# Patient Record
Sex: MALE | URBAN - METROPOLITAN AREA
[De-identification: names, ages, dates, MRNs, and addresses within clinical notes are randomized per-mention and may not be internally consistent; named-entity substitution may affect disease eponyms.]

---

## 2018-04-30 ENCOUNTER — APPOINTMENT (OUTPATIENT)
Age: 83
Setting detail: DERMATOLOGY
End: 2018-05-05

## 2018-04-30 VITALS
SYSTOLIC BLOOD PRESSURE: 130 MMHG | WEIGHT: 190 LBS | HEIGHT: 66 IN | DIASTOLIC BLOOD PRESSURE: 83 MMHG | HEART RATE: 83 BPM

## 2018-04-30 DIAGNOSIS — L12.0 BULLOUS PEMPHIGOID: ICD-10-CM

## 2018-04-30 PROCEDURE — OTHER COUNSELING: OTHER

## 2018-04-30 PROCEDURE — OTHER OTHER: OTHER

## 2018-04-30 PROCEDURE — OTHER MIPS QUALITY: OTHER

## 2018-04-30 PROCEDURE — 99203 OFFICE O/P NEW LOW 30 MIN: CPT

## 2018-04-30 PROCEDURE — OTHER PRESCRIPTION: OTHER

## 2018-04-30 PROCEDURE — OTHER PATIENT SPECIFIC COUNSELING: OTHER

## 2018-04-30 RX ORDER — PREDNISONE 10 MG/1
TABLET ORAL QAM
Qty: 140 | Refills: 0 | Status: ERX | COMMUNITY
Start: 2018-04-30

## 2018-04-30 ASSESSMENT — LOCATION DETAILED DESCRIPTION DERM
LOCATION DETAILED: LEFT ANTERIOR DISTAL UPPER ARM
LOCATION DETAILED: RIGHT DISTAL PRETIBIAL REGION
LOCATION DETAILED: LEFT DISTAL PRETIBIAL REGION
LOCATION DETAILED: RIGHT ANTERIOR PROXIMAL UPPER ARM

## 2018-04-30 ASSESSMENT — LOCATION SIMPLE DESCRIPTION DERM
LOCATION SIMPLE: LEFT PRETIBIAL REGION
LOCATION SIMPLE: LEFT UPPER ARM
LOCATION SIMPLE: RIGHT PRETIBIAL REGION
LOCATION SIMPLE: RIGHT UPPER ARM

## 2018-04-30 ASSESSMENT — LOCATION ZONE DERM
LOCATION ZONE: ARM
LOCATION ZONE: LEG

## 2018-04-30 ASSESSMENT — SEVERITY ASSESSMENT: SEVERITY: MODERATE TO SEVERE

## 2018-04-30 ASSESSMENT — PAIN INTENSITY VAS: HOW INTENSE IS YOUR PAIN 0 BEING NO PAIN, 10 BEING THE MOST SEVERE PAIN POSSIBLE?: NO PAIN

## 2018-04-30 NOTE — PROCEDURE: PATIENT SPECIFIC COUNSELING
I explained etiology of condition in depth to the patient, including the autoimmune cause of the condition. I recommended a referral for evaluation to the [facility] to see Dr. Dye. I contacted the patient’s primary care physician (Dr. Castaneda) and informed her of the referral to the [place] and the working diagnosis. I will work with Dr. Dye to help manage the patient if needed.  I prescribed prednisone 40mg daily for two weeks, and to decrease by 10 mg every two weeks. Follow up in two months The patient expresses understanding.\\n\\nNOTE: An appointment was made for patient on May 5th, however we are unable to reach the patient at all his numbers listed. A letter has been mailed to patient informing him of his appointment. I explained etiology of condition in depth to the patient, including the autoimmune cause of the condition. I recommended a referral for evaluation to the [facility] to see Dr. Dye. I contacted the patient’s primary care physician (Dr. Castaneda) and informed her of the referral to the [place] and the working diagnosis. I will work with Dr. Dye to help manage the patient if needed.  I prescribed prednisone 40mg daily for two weeks, and to decrease by 10 mg every two weeks. Follow up in two months The patient expresses understanding.\\n\\nNOTE: An appointment was made for patient on May 5th, however we are unable to reach the patient at all his numbers listed. A letter has been mailed to patient informing him of his appointment.

## 2018-04-30 NOTE — HPI: RASH
How Severe Is Your Rash?: mild
Is This A New Presentation, Or A Follow-Up?: Rash
Additional History: Pain 0/10

## 2018-04-30 NOTE — PROCEDURE: OTHER
Note Text (......Xxx Chief Complaint.): This diagnosis correlates with the
Detail Level: Zone
Other (Free Text): We have attempted to reach this patient but all listed numbers out of service.

## 2018-04-30 NOTE — PROCEDURE: MIPS QUALITY
Quality 131: Pain Assessment And Follow-Up: Pain assessment documented as positive using a standardized tool AND a follow-up plan is documented
Detail Level: Zone
Quality 226: Preventive Care And Screening: Tobacco Use: Screening And Cessation Intervention: Patient screened for tobacco and never smoked

## 2018-07-25 ENCOUNTER — APPOINTMENT (OUTPATIENT)
Age: 83
Setting detail: DERMATOLOGY
End: 2018-07-27

## 2018-07-25 VITALS
SYSTOLIC BLOOD PRESSURE: 119 MMHG | DIASTOLIC BLOOD PRESSURE: 63 MMHG | HEART RATE: 75 BPM | HEIGHT: 68 IN | WEIGHT: 194 LBS

## 2018-07-25 DIAGNOSIS — L12.0 BULLOUS PEMPHIGOID: ICD-10-CM

## 2018-07-25 PROBLEM — L30.9 DERMATITIS, UNSPECIFIED: Status: ACTIVE | Noted: 2018-07-25

## 2018-07-25 PROCEDURE — OTHER MIPS QUALITY: OTHER

## 2018-07-25 PROCEDURE — OTHER COUNSELING: OTHER

## 2018-07-25 PROCEDURE — 99213 OFFICE O/P EST LOW 20 MIN: CPT | Mod: 25

## 2018-07-25 PROCEDURE — 11100: CPT

## 2018-07-25 PROCEDURE — OTHER PATIENT SPECIFIC COUNSELING: OTHER

## 2018-07-25 PROCEDURE — OTHER BIOPSY BY PUNCH METHOD: OTHER

## 2018-07-25 PROCEDURE — 11101: CPT

## 2018-07-25 ASSESSMENT — SEVERITY ASSESSMENT: SEVERITY: MODERATE

## 2018-07-25 ASSESSMENT — LOCATION DETAILED DESCRIPTION DERM
LOCATION DETAILED: RIGHT PROXIMAL PRETIBIAL REGION
LOCATION DETAILED: RIGHT DISTAL PRETIBIAL REGION
LOCATION DETAILED: LEFT DISTAL PRETIBIAL REGION
LOCATION DETAILED: LEFT PROXIMAL PRETIBIAL REGION

## 2018-07-25 ASSESSMENT — LOCATION SIMPLE DESCRIPTION DERM
LOCATION SIMPLE: LEFT PRETIBIAL REGION
LOCATION SIMPLE: RIGHT PRETIBIAL REGION

## 2018-07-25 ASSESSMENT — BSA RASH: BSA RASH: 6

## 2018-07-25 ASSESSMENT — PAIN INTENSITY VAS: HOW INTENSE IS YOUR PAIN 0 BEING NO PAIN, 10 BEING THE MOST SEVERE PAIN POSSIBLE?: NO PAIN

## 2018-07-25 ASSESSMENT — LOCATION ZONE DERM: LOCATION ZONE: LEG

## 2018-07-25 NOTE — PROCEDURE: BIOPSY BY PUNCH METHOD
Dressing: bandage
X Size Of Lesion In Cm (Optional): 0
Anesthesia Type: 1% lidocaine without epinephrine and a 1:10 solution of 8.4% sodium bicarbonate
Wound Care: Petrolatum
Bill 77544 For Specimen Handling/Conveyance To Laboratory?: no
Punch Size In Mm: 4
Biopsy Type: DIF
Consent: Written consent was obtained and risks were reviewed including but not limited to scarring, infection, bleeding, scabbing, incomplete removal, nerve damage and allergy to anesthesia.
Hemostasis: Electrodesiccation
Post-Care Instructions: I reviewed with the patient in detail post-care instructions. Patient is to keep the biopsy site dry overnight, and then apply bacitracin twice daily until healed. Patient may apply hydrogen peroxide soaks to remove any crusting.
Billing Type: Third-Party Bill
Anesthesia Volume In Cc (Will Not Render If 0): 1
Detail Level: Detailed
Notification Instructions: Patient will be notified of biopsy results. However, patient instructed to call the office if not contacted within 2 weeks.
Epidermal Sutures: none, closed by secondary intention
Home Suture Removal Text: Patient was provided a home suture removal kit and will remove their sutures at home.  If they have any questions or difficulties they will call the office.
Was A Bandage Applied: Yes
Biopsy Type: H and E

## 2018-07-25 NOTE — PROCEDURE: PATIENT SPECIFIC COUNSELING
Due to condition recurring, strongly advised patient to contact the [facility] and schedule a follow up for evaluation while flared. Discussed further treatment options including biopsy. Explained that Dr. Dye may require an additional biopsy or additional testing upon evaluation. Patient verbalized understanding and elects to proceed with biopsy today. Due to condition recurring, strongly advised patient to contact the [facility] and schedule a follow up for evaluation while flared. Discussed further treatment options including biopsy. Explained that Dr. Dye may require an additional biopsy or additional testing upon evaluation. Patient verbalized understanding and elects to proceed with biopsy today.